# Patient Record
Sex: FEMALE | Race: ASIAN | NOT HISPANIC OR LATINO
[De-identification: names, ages, dates, MRNs, and addresses within clinical notes are randomized per-mention and may not be internally consistent; named-entity substitution may affect disease eponyms.]

---

## 2017-08-30 PROBLEM — Z00.00 ENCOUNTER FOR PREVENTIVE HEALTH EXAMINATION: Status: ACTIVE | Noted: 2017-08-30

## 2017-08-31 ENCOUNTER — APPOINTMENT (OUTPATIENT)
Dept: VASCULAR SURGERY | Facility: CLINIC | Age: 62
End: 2017-08-31
Payer: MEDICAID

## 2017-08-31 VITALS — HEART RATE: 58 BPM | OXYGEN SATURATION: 98 % | SYSTOLIC BLOOD PRESSURE: 154 MMHG | DIASTOLIC BLOOD PRESSURE: 84 MMHG

## 2017-08-31 DIAGNOSIS — Z82.49 FAMILY HISTORY OF ISCHEMIC HEART DISEASE AND OTHER DISEASES OF THE CIRCULATORY SYSTEM: ICD-10-CM

## 2017-08-31 PROCEDURE — 99204 OFFICE O/P NEW MOD 45 MIN: CPT | Mod: 25

## 2017-08-31 PROCEDURE — 93970 EXTREMITY STUDY: CPT

## 2017-09-06 PROBLEM — Z82.49 FAMILY HISTORY OF VARICOSE VEINS: Status: ACTIVE | Noted: 2017-09-06

## 2017-09-06 RX ORDER — VALSARTAN 40 MG/1
TABLET, COATED ORAL
Refills: 0 | Status: ACTIVE | COMMUNITY

## 2017-09-06 RX ORDER — AMLODIPINE BESYLATE 2.5 MG/1
2.5 TABLET ORAL
Refills: 0 | Status: ACTIVE | COMMUNITY

## 2017-09-06 RX ORDER — LEVOTHYROXINE SODIUM 0.1 MG/1
100 TABLET ORAL
Refills: 0 | Status: ACTIVE | COMMUNITY

## 2017-10-11 ENCOUNTER — APPOINTMENT (OUTPATIENT)
Dept: VASCULAR SURGERY | Facility: HOSPITAL | Age: 62
End: 2017-10-11

## 2017-10-11 ENCOUNTER — OUTPATIENT (OUTPATIENT)
Dept: OUTPATIENT SERVICES | Facility: HOSPITAL | Age: 62
LOS: 1 days | Discharge: HOME | End: 2017-10-11
Payer: MEDICAID

## 2017-10-11 PROCEDURE — 37766 PHLEB VEINS - EXTREM 20+: CPT | Mod: RT,GC

## 2017-10-17 DIAGNOSIS — M85.89 OTHER SPECIFIED DISORDERS OF BONE DENSITY AND STRUCTURE, MULTIPLE SITES: ICD-10-CM

## 2017-10-17 DIAGNOSIS — E66.9 OBESITY, UNSPECIFIED: ICD-10-CM

## 2017-10-17 DIAGNOSIS — I10 ESSENTIAL (PRIMARY) HYPERTENSION: ICD-10-CM

## 2017-10-17 DIAGNOSIS — I83.891 VARICOSE VEINS OF RIGHT LOWER EXTREMITY WITH OTHER COMPLICATIONS: ICD-10-CM

## 2017-10-19 ENCOUNTER — APPOINTMENT (OUTPATIENT)
Dept: VASCULAR SURGERY | Facility: CLINIC | Age: 62
End: 2017-10-19
Payer: MEDICAID

## 2017-10-19 VITALS — SYSTOLIC BLOOD PRESSURE: 132 MMHG | HEART RATE: 95 BPM | DIASTOLIC BLOOD PRESSURE: 82 MMHG | OXYGEN SATURATION: 96 %

## 2017-10-19 PROCEDURE — 99024 POSTOP FOLLOW-UP VISIT: CPT

## 2018-02-15 ENCOUNTER — APPOINTMENT (OUTPATIENT)
Dept: VASCULAR SURGERY | Facility: CLINIC | Age: 63
End: 2018-02-15
Payer: MEDICAID

## 2018-02-15 VITALS — HEART RATE: 58 BPM | OXYGEN SATURATION: 100 % | SYSTOLIC BLOOD PRESSURE: 138 MMHG | DIASTOLIC BLOOD PRESSURE: 76 MMHG

## 2018-02-15 DIAGNOSIS — S80.11XA CONTUSION OF RIGHT LOWER LEG, INITIAL ENCOUNTER: ICD-10-CM

## 2018-02-15 PROCEDURE — 99214 OFFICE O/P EST MOD 30 MIN: CPT

## 2018-05-09 ENCOUNTER — RESULT CHARGE (OUTPATIENT)
Age: 63
End: 2018-05-09

## 2018-05-10 ENCOUNTER — APPOINTMENT (OUTPATIENT)
Dept: VASCULAR SURGERY | Facility: CLINIC | Age: 63
End: 2018-05-10
Payer: MEDICAID

## 2018-05-10 PROCEDURE — 29580 STRAPPING UNNA BOOT: CPT | Mod: RT

## 2018-05-10 PROCEDURE — 99213 OFFICE O/P EST LOW 20 MIN: CPT | Mod: 25

## 2018-05-24 ENCOUNTER — APPOINTMENT (OUTPATIENT)
Dept: VASCULAR SURGERY | Facility: CLINIC | Age: 63
End: 2018-05-24
Payer: MEDICAID

## 2018-05-24 PROCEDURE — 99213 OFFICE O/P EST LOW 20 MIN: CPT

## 2018-06-21 ENCOUNTER — APPOINTMENT (OUTPATIENT)
Age: 63
End: 2018-06-21
Payer: MEDICAID

## 2018-06-21 VITALS — SYSTOLIC BLOOD PRESSURE: 140 MMHG | DIASTOLIC BLOOD PRESSURE: 80 MMHG | HEART RATE: 56 BPM | OXYGEN SATURATION: 96 %

## 2018-06-21 DIAGNOSIS — L97.309 NON-PRESSURE CHRONIC ULCER OF UNSPECIFIED ANKLE WITH UNSPECIFIED SEVERITY: ICD-10-CM

## 2018-06-21 DIAGNOSIS — I87.2 VENOUS INSUFFICIENCY (CHRONIC) (PERIPHERAL): ICD-10-CM

## 2018-06-21 PROCEDURE — 99213 OFFICE O/P EST LOW 20 MIN: CPT

## 2018-06-21 PROCEDURE — 93970 EXTREMITY STUDY: CPT

## 2018-06-21 RX ORDER — FLUOCINONIDE 0.5 MG/G
0.05 CREAM TOPICAL TWICE DAILY
Qty: 1 | Refills: 1 | Status: COMPLETED | COMMUNITY
Start: 2018-05-24 | End: 2018-06-21

## 2018-06-28 ENCOUNTER — APPOINTMENT (OUTPATIENT)
Dept: VASCULAR SURGERY | Facility: CLINIC | Age: 63
End: 2018-06-28
Payer: MEDICAID

## 2018-06-28 DIAGNOSIS — I87.2 VENOUS INSUFFICIENCY (CHRONIC) (PERIPHERAL): ICD-10-CM

## 2018-06-28 DIAGNOSIS — I83.899 VARICOSE VEINS OF UNSPECIFIED LOWER EXTREMITY WITH OTHER COMPLICATIONS: ICD-10-CM

## 2018-06-28 PROCEDURE — 36468 NJX SCLRSNT SPIDER VEINS: CPT

## 2019-12-13 ENCOUNTER — APPOINTMENT (OUTPATIENT)
Dept: VASCULAR SURGERY | Facility: CLINIC | Age: 64
End: 2019-12-13

## 2020-01-31 ENCOUNTER — APPOINTMENT (OUTPATIENT)
Dept: VASCULAR SURGERY | Facility: CLINIC | Age: 65
End: 2020-01-31
Payer: MEDICAID

## 2020-01-31 DIAGNOSIS — I70.1 ATHEROSCLEROSIS OF RENAL ARTERY: ICD-10-CM

## 2020-01-31 PROCEDURE — 99215 OFFICE O/P EST HI 40 MIN: CPT

## 2020-01-31 NOTE — END OF VISIT
[FreeTextEntry3] : All medical record entries made by the Scribe were at my, Dr. Thornton's, discretion and personally dictated by me on 01/31/2020 . I have reviewed the chart and agree that the record accurately reflects my personal performance of the history, physical exam, assessment and plan. I have also personally directed, reviewed and agreed to the chart.

## 2020-01-31 NOTE — HISTORY OF PRESENT ILLNESS
[FreeTextEntry1] : 65 y/o F with PMHx of HTN s/p RLE GSV RFA and RLE stab phlebectomy of large anterior thigh varicosity for pain of the R pretibia presents today for a follow up on RLE pain. Pt has been using compression stocking and Lidex to the R pretibia, improved, but pt still reports burning pain deep within the R pretibia, and occasional burning in the L pretibia despite conservative therapy.  She denies any new wounds or drainage from the skin, and denies fever/chills.

## 2020-01-31 NOTE — ASSESSMENT
[FreeTextEntry1] : 65 y/o F with HTN s/p RLE GSV RFA and RLE stab phlebectomy who presents here today for follow up of pain in the R pretibia and review of abdominal MRI.  I advised patient to c/w Lidex for her LE edema. I obtained patient's MRI abdomen which did not demonstrate a vascular problem. I informed patient that I will speak to the doctor who referred her here and then get back in contact with her.\par \par

## 2020-01-31 NOTE — REVIEW OF SYSTEMS
[As Noted in HPI] : as noted in HPI [Skin Lesions] : skin lesion [Itching] : itching [Negative] : Heme/Lymph [FreeTextEntry9] : + Muscle cramps

## 2020-01-31 NOTE — ADDENDUM
[FreeTextEntry1] : This note was written by Nola Fnuez on 01/31/2020  acting as scribe for Dr. Perez.

## 2020-01-31 NOTE — PHYSICAL EXAM
[Normal Thyroid] : the thyroid was normal [Normal Breath Sounds] : Normal breath sounds [Normal Heart Sounds] : normal heart sounds [0] : right 0 [1+] : left 1+ [2+] : right 2+ [Ankle Swelling (On Exam)] : present [Ankle Swelling Bilaterally] : bilaterally  [] : bilaterally [Ankle Swelling On The Right] : mild [Alert] : alert [Calm] : calm [JVD] : no jugular venous distention  [Purpura] : no purpura  [Petechiae] : no petechiae [Skin Ulcer] : no ulcer [Skin Induration] : no induration [de-identified] : Well-nourished, NAD [de-identified] : NC/AT [de-identified] : Large pannus [FreeTextEntry1] : +pedal edema [de-identified] : FROM in LE b/l, strength 5/5x4, neg Matt's sign, no palpable cords [de-identified] : Non-tender hyperemia of the R pretibia, no erythema noted

## 2020-01-31 NOTE — ASSESSMENT
[FreeTextEntry1] : 63 y/o F with HTN s/p RLE GSV RFA and RLE stab phlebectomy who presents here today for follow up of pain in the R pretibia and review of abdominal MRI.  I advised patient to c/w Lidex for her LE edema. I obtained patient's MRI abdomen which did not demonstrate a vascular problem. I informed patient that I will speak to the doctor who referred her here and then get back in contact with her.\par \par

## 2020-01-31 NOTE — ADDENDUM
[FreeTextEntry1] : This note was written by Nola Funez on 01/31/2020  acting as scribe for Dr. Perez.

## 2020-01-31 NOTE — PHYSICAL EXAM
[Normal Thyroid] : the thyroid was normal [Normal Breath Sounds] : Normal breath sounds [0] : right 0 [Normal Heart Sounds] : normal heart sounds [1+] : left 1+ [2+] : right 2+ [Ankle Swelling (On Exam)] : present [Ankle Swelling Bilaterally] : bilaterally  [] : present [Ankle Swelling On The Right] : mild [Alert] : alert [Calm] : calm [JVD] : no jugular venous distention  [Purpura] : no purpura  [Skin Ulcer] : no ulcer [Petechiae] : no petechiae [Skin Induration] : no induration [de-identified] : Well-nourished, NAD [FreeTextEntry1] : +pedal edema [de-identified] : Large pannus [de-identified] : NC/AT [de-identified] : FROM in LE b/l, strength 5/5x4, neg Matt's sign, no palpable cords [de-identified] : Non-tender hyperemia of the R pretibia, no erythema noted

## 2020-01-31 NOTE — PROCEDURE
[FreeTextEntry1] : R medial calf incompetent  injected at bedside w/sono guidance.  Skin cleaned with alcohol solution.  Vein injected w/ 0.5 cc of 1% sotradecol diluted with NS to 0.5% foam.  Successful injection of sclerosant into vein seen under u/s guidance.  Leg wrapped w/ ACE wrap.

## 2020-02-05 DIAGNOSIS — Z86.79 PERSONAL HISTORY OF OTHER DISEASES OF THE CIRCULATORY SYSTEM: ICD-10-CM

## 2020-02-05 PROBLEM — I70.1 RENAL ARTERY STENOSIS: Status: ACTIVE | Noted: 2020-02-05

## 2020-08-28 ENCOUNTER — APPOINTMENT (OUTPATIENT)
Dept: VASCULAR SURGERY | Facility: CLINIC | Age: 65
End: 2020-08-28
Payer: MEDICAID

## 2020-08-28 PROCEDURE — 93880 EXTRACRANIAL BILAT STUDY: CPT

## 2020-08-28 PROCEDURE — 99213 OFFICE O/P EST LOW 20 MIN: CPT

## 2020-08-28 NOTE — REVIEW OF SYSTEMS
[As Noted in HPI] : as noted in HPI [Skin Lesions] : skin lesion [Itching] : itching [Negative] : Psychiatric [FreeTextEntry9] : + Muscle cramps

## 2020-08-28 NOTE — PHYSICAL EXAM
[Normal Thyroid] : the thyroid was normal [Normal Breath Sounds] : Normal breath sounds [Respiratory Effort] : normal respiratory effort [0] : right 0 [Normal Rate and Rhythm] : normal rate and rhythm [Normal Heart Sounds] : normal heart sounds [1+] : left 1+ [Ankle Swelling Bilaterally] : bilaterally  [Ankle Swelling (On Exam)] : present [2+] : left 2+ [] : bilaterally [Ankle Swelling On The Right] : mild [Alert] : alert [Calm] : calm [JVD] : no jugular venous distention  [Purpura] : no purpura  [Petechiae] : no petechiae [Skin Ulcer] : no ulcer [Skin Induration] : no induration [de-identified] : Well-nourished, NAD [de-identified] : NC/AT [de-identified] : Supple  [FreeTextEntry1] : +pedal edema [de-identified] : FROM in LE b/l, strength 5/5x4, neg Matt's sign, no palpable cords [de-identified] : Non-tender hyperemia of the R pretibia, no erythema noted [de-identified] : Large pannus

## 2020-08-28 NOTE — ADDENDUM
[FreeTextEntry1] : This note was written by Katherin Rowe on 08/28/2020 acting as scribe for Obinna Patel M.D.\par \par I, Obinna Patel , have read and attest that all the information, medical decision making and discharge instructions within are true and accurate.

## 2020-08-28 NOTE — HISTORY OF PRESENT ILLNESS
[FreeTextEntry1] : 63 y/o F with PMHx of HTN s/p RLE GSV RFA and RLE stab phlebectomy of large anterior thigh varicosity for pain of the R pretibia presents today for a follow up on RLE pain. Pt has been using compression stocking and Lidex to the R pretibia, improved, but pt still reports burning pain deep within the R pretibia, and occasional burning in the L pretibia despite conservative therapy.  She denies any new wounds or drainage from the skin, and denies fever/chills.

## 2020-10-30 ENCOUNTER — APPOINTMENT (OUTPATIENT)
Dept: VASCULAR SURGERY | Facility: CLINIC | Age: 65
End: 2020-10-30
Payer: MEDICAID

## 2020-10-30 PROCEDURE — 99072 ADDL SUPL MATRL&STAF TM PHE: CPT

## 2020-10-30 PROCEDURE — 99213 OFFICE O/P EST LOW 20 MIN: CPT | Mod: 25

## 2020-10-30 PROCEDURE — 93971 EXTREMITY STUDY: CPT

## 2020-10-30 PROCEDURE — 29580 STRAPPING UNNA BOOT: CPT | Mod: LT

## 2020-10-31 NOTE — ASSESSMENT
[FreeTextEntry1] : 63 y/o F w/ hx of varicose veins s/p  s/p RLE GSV RFA and RLE stab phlebectomy. On exam, L Lateral tibia: dime size clean wound with surrounding erythema, no active drainage, no signs of infection. Feet are pink, toes are warm to touch with adequate capillary refill. LLE venous doppler done at the office today: Negative DVT, GSV/SSV patent. LLE Wound care: Wounds cleaned with hydrogen peroxide, A&D ointment applied. Wrapped with unna boot, Kerlix and ACE bandage. Discussed with pt no vascular surgery intervention required at the moment.  Will set up VNS for weekly unna boot placement and wound care. Patient to f/u with us here in 4 weeks. She was advised to call the office in case of developing any concerning symptoms. \par  [Ulcer Care] : ulcer care

## 2020-10-31 NOTE — HISTORY OF PRESENT ILLNESS
[FreeTextEntry1] : 65 y/o F with PMHx of HTN  large varicose vein to the anterior R thigh and pretibia s/p RLE GSV RFA presents today for a follow up on RLE pain. Patient presents for 2 month follow up, pt reports she developed  new ulcer to the  LLE x 2 weeks ago, initially it was a blister which ruptured with clear drainage and has been very painful, she also c/o burning sensation around the wound area. \par Denies: fever, chills.

## 2020-10-31 NOTE — PROCEDURE
[FreeTextEntry1] : LLE venous doppler done at the office today: Negative DVT, GSV/SSV patent. \par \par LLE Wound care: Wounds cleaned with hydrogen peroxide, A&D ointment applied. Wrapped with unna boot, Kerlix and ACE bandage.\par

## 2020-10-31 NOTE — ADDENDUM
[FreeTextEntry1] : This note was written by Katherin Rowe on 10/30/2020 acting as scribe for Obinna Barreto M.D.\par \par I, Obinna Patel have read and attest that all the information, medical decision making and discharge instructions within are true and accurate.

## 2020-10-31 NOTE — PHYSICAL EXAM
[Normal Thyroid] : the thyroid was normal [Normal Breath Sounds] : Normal breath sounds [Respiratory Effort] : normal respiratory effort [Normal Heart Sounds] : normal heart sounds [Normal Rate and Rhythm] : normal rate and rhythm [0] : right 0 [1+] : left 1+ [2+] : left 2+ [Ankle Swelling (On Exam)] : present [Ankle Swelling Bilaterally] : bilaterally  [] : bilaterally [Ankle Swelling On The Right] : mild [Alert] : alert [Calm] : calm [Oriented to Person] : oriented to person [Oriented to Place] : oriented to place [Oriented to Time] : oriented to time [JVD] : no jugular venous distention  [Purpura] : no purpura  [Petechiae] : no petechiae [Skin Ulcer] : no ulcer [Skin Induration] : no induration [de-identified] : Well-nourished, NAD [de-identified] : NC/AT [de-identified] : Supple  [de-identified] : Large pannus [de-identified] : FROM in LOGAN b/l, strength 5/5x4. [de-identified] : L Lateral tibia: dime size clean wound with surrounding erythema, no active drainage, no signs of infection. Feet are pink, toes are warm to touch with adequate capillary refill.

## 2020-11-13 ENCOUNTER — APPOINTMENT (OUTPATIENT)
Dept: VASCULAR SURGERY | Facility: CLINIC | Age: 65
End: 2020-11-13
Payer: MEDICARE

## 2020-11-13 PROCEDURE — 99072 ADDL SUPL MATRL&STAF TM PHE: CPT

## 2020-11-13 PROCEDURE — 29580 STRAPPING UNNA BOOT: CPT | Mod: LT

## 2020-11-13 PROCEDURE — 99212 OFFICE O/P EST SF 10 MIN: CPT | Mod: 25

## 2020-11-13 NOTE — ASSESSMENT
[FreeTextEntry1] : 66 y/o F w/ hx of varicose veins s/p  s/p RLE GSV RFA and RLE stab phlebectomy. On exam, L Lateral tibia: dime size clean wound slightly smaller with surrounding erythema, no active drainage, no signs of infection. Feet are pink, toes are warm to touch with adequate capillary refill. LLE Wound care: Wounds cleaned with hydrogen peroxide, A&D ointment applied. Wrapped with unna boot, Kerlix and ACE bandage. VNS services to begin wound care at pts home next week. Pt will f/u with us in 4 weeks. Patient to contact the office in case she develops any concerning symptoms. \par  \par

## 2020-11-13 NOTE — PHYSICAL EXAM
[Normal Thyroid] : the thyroid was normal [Normal Breath Sounds] : Normal breath sounds [Respiratory Effort] : normal respiratory effort [Normal Heart Sounds] : normal heart sounds [Normal Rate and Rhythm] : normal rate and rhythm [0] : right 0 [1+] : left 1+ [2+] : left 2+ [Ankle Swelling (On Exam)] : present [Ankle Swelling Bilaterally] : bilaterally  [] : bilaterally [Ankle Swelling On The Right] : mild [Alert] : alert [Oriented to Person] : oriented to person [Oriented to Place] : oriented to place [Oriented to Time] : oriented to time [Calm] : calm [JVD] : no jugular venous distention  [Purpura] : no purpura  [Petechiae] : no petechiae [Skin Ulcer] : no ulcer [Skin Induration] : no induration [de-identified] : Well-nourished, NAD [de-identified] : NC/AT [de-identified] : Supple  [de-identified] : Large pannus [de-identified] : FROM in LE b/l, strength 5/5 x 4. [de-identified] : L Lateral tibia: dime size clean wound slightly smaller with surrounding erythema, no active drainage, no signs of infection. Feet are pink, toes are warm to touch with adequate capillary refill.

## 2020-11-13 NOTE — PROCEDURE
[FreeTextEntry1] : LLE Wound care: Wounds cleaned with hydrogen peroxide, A&D ointment applied. Wrapped with unna boot, Kerlix and ACE bandage.\par

## 2020-11-13 NOTE — HISTORY OF PRESENT ILLNESS
[FreeTextEntry1] : 64 y/o F with PMHx of HTN, large varicose vein to the anterior R thigh and pretibial s/p RLE GSV RFA presents today for 4 week follow up for a LLE ulcer . Pt reports here feeling well, she does continue to c/o mild pain to the LLE but notice it has somewhat improved.She reports VNS approved to begin next week.  Denies: fever, chills.

## 2020-11-13 NOTE — ADDENDUM
[FreeTextEntry1] : This note was written by Katherin Rowe on 11/13/2020 acting as scribe for Obinna Barreto M.D.\par \par I, Obinna Patel have read and attest that all the information, medical decision making and discharge instructions within are true and accurate.

## 2020-12-02 ENCOUNTER — APPOINTMENT (OUTPATIENT)
Dept: VASCULAR SURGERY | Facility: CLINIC | Age: 65
End: 2020-12-02
Payer: MEDICARE

## 2020-12-02 DIAGNOSIS — L97.921 NON-PRESSURE CHRONIC ULCER OF UNSPECIFIED PART OF LEFT LOWER LEG LIMITED TO BREAKDOWN OF SKIN: ICD-10-CM

## 2020-12-02 PROCEDURE — 29580 STRAPPING UNNA BOOT: CPT | Mod: LT

## 2020-12-02 PROCEDURE — 99214 OFFICE O/P EST MOD 30 MIN: CPT | Mod: 25

## 2020-12-02 PROCEDURE — 99072 ADDL SUPL MATRL&STAF TM PHE: CPT

## 2020-12-02 NOTE — ASSESSMENT
[FreeTextEntry1] : 64 y/o F w/ hx of varicose veins s/p  s/p RLE GSV RFA and RLE stab phlebectomy is here for LLE venous stasis ulcer f/u.On exam, L Lateral tibia: ulcer is healing well, much smaller, no active drainage, no signs of infection. Feet are pink, toes are warm to touch with adequate capillary refill. LLE Wound care:  Wrapped with unna boot, Kerlix and ACE bandage. Unna boot dressing for 2 weeks was provided to the patient Pt will f/u with us in 4 weeks. Patient to contact the office in case she develops any concerning symptoms. \par  \par  [Ulcer Care] : ulcer care

## 2020-12-02 NOTE — HISTORY OF PRESENT ILLNESS
[FreeTextEntry1] : 66 y/o F presents today for follow up for a LLE ulcer . Pt reports here feeling well, she does continue to c/o mild pain at the ulcer site, but noticed it has been getting smaller..She reports that VNS came to evaluate her, but due to lack of supplies they closed the case. Patient had few unna boot at home and her  wrapped her leg since it is difficult for her to come to the city for her appointments.  Denies: fever, chills.

## 2022-01-03 ENCOUNTER — APPOINTMENT (OUTPATIENT)
Dept: VASCULAR SURGERY | Facility: CLINIC | Age: 67
End: 2022-01-03

## 2022-08-11 ENCOUNTER — APPOINTMENT (OUTPATIENT)
Dept: VASCULAR SURGERY | Facility: CLINIC | Age: 67
End: 2022-08-11

## 2022-08-11 PROCEDURE — 29580 STRAPPING UNNA BOOT: CPT

## 2022-08-11 PROCEDURE — 99213 OFFICE O/P EST LOW 20 MIN: CPT | Mod: 25

## 2022-08-11 NOTE — HISTORY OF PRESENT ILLNESS
[FreeTextEntry1] : 65 y/o F presents today with a new RLE ulcers . Pt previously had venous stasis ulcer on her LLE that were successfully treated with unna boot wraps. She was last seen here in December, 2020. Patient states that her ulcers developed a week ago, she tried to wrap her leg at home, however she doesn't see any improvement.  Denies edema, pain, fever, chills.

## 2022-08-11 NOTE — PHYSICAL EXAM
[Normal Thyroid] : the thyroid was normal [Normal Breath Sounds] : Normal breath sounds [Respiratory Effort] : normal respiratory effort [Normal Heart Sounds] : normal heart sounds [Normal Rate and Rhythm] : normal rate and rhythm [] : bilaterally [Ankle Swelling On The Right] : mild [Alert] : alert [Oriented to Person] : oriented to person [Oriented to Place] : oriented to place good, to achieve stated therapy goals [Oriented to Time] : oriented to time [Calm] : calm [JVD] : no jugular venous distention  [2+] : right 2+ [Ankle Swelling (On Exam)] : not present [Purpura] : no purpura  [Petechiae] : no petechiae [Skin Ulcer] : no ulcer [Skin Induration] : no induration [de-identified] : Well-nourished, NAD [de-identified] : NC/AT [de-identified] : Supple  [de-identified] : Large pannus [de-identified] : FROM in LE b/l, strength 5/5 x 4. [de-identified] : R Lateral tibia: 0.5 x 0.5 cm ulcer clean, no active drainage, 1 x 1 cm ulcer over posterior calf, superficial,  no signs of infection.

## 2022-08-11 NOTE — ASSESSMENT
[Ulcer Care] : ulcer care [FreeTextEntry1] : 65 y/o F presents today with a new RLE ulcers . Pt previously had venous stasis ulcer on her LLE that were successfully treated with unna boot wraps. She was last seen here in December, 2020. Patient states that her ulcers developed a week ago, she tried to wrap her leg at home, however she doesn't see any improvement.  Denies edema, pain, fever, chills. \par Recurrent venous stasis ulcers of right lower leg, superficial, without signs of infection.\par RLE Wound care:  Wrapped with unna boot, Kerlix and ACE bandage.\par Patient is traveling to [place] for a wedding and will ask her spouse to wrap her leg next week, supplies for 1 time were provided.\par She will f/u upon her return if ulcers not healed at that time for evaluation.\par

## 2022-08-11 NOTE — REVIEW OF SYSTEMS
[As Noted in HPI] : as noted in HPI [Skin Lesions] : skin lesion [Negative] : Heme/Lymph [Leg Claudication] : no intermittent leg claudication [Lower Ext Edema] : no lower extremity edema [Itching] : no itching [FreeTextEntry9] : + Muscle cramps

## 2022-08-18 ENCOUNTER — APPOINTMENT (OUTPATIENT)
Dept: VASCULAR SURGERY | Facility: CLINIC | Age: 67
End: 2022-08-18

## 2022-08-18 PROCEDURE — 29580 STRAPPING UNNA BOOT: CPT

## 2022-08-19 NOTE — PHYSICAL EXAM
[JVD] : no jugular venous distention  [Normal Thyroid] : the thyroid was normal [Normal Breath Sounds] : Normal breath sounds [Respiratory Effort] : normal respiratory effort [Normal Heart Sounds] : normal heart sounds [Normal Rate and Rhythm] : normal rate and rhythm [2+] : right 2+ [Ankle Swelling (On Exam)] : not present [] : bilaterally [Ankle Swelling On The Right] : mild [Purpura] : no purpura  [Petechiae] : no petechiae [Skin Ulcer] : no ulcer [Skin Induration] : no induration [Alert] : alert [Oriented to Person] : oriented to person [Oriented to Place] : oriented to place [Oriented to Time] : oriented to time [Calm] : calm [de-identified] : Well-nourished, NAD [de-identified] : NC/AT [de-identified] : Supple  [de-identified] : Large pannus [de-identified] : FROM in LE b/l, strength 5/5 x 4. [de-identified] : R Lateral tibia: 0.5 x 0.5 cm ulcer clean, no active drainage, 1 x 1 cm ulcer over posterior calf, superficial,  no signs of infection.

## 2022-08-19 NOTE — REVIEW OF SYSTEMS
[Leg Claudication] : no intermittent leg claudication [Lower Ext Edema] : no lower extremity edema [As Noted in HPI] : as noted in HPI [Skin Lesions] : skin lesion [Itching] : no itching [Negative] : Heme/Lymph [FreeTextEntry9] : + Muscle cramps

## 2022-08-19 NOTE — ASSESSMENT
[FreeTextEntry1] : 65 y/o F presents today with a new RLE ulcers . Pt previously had venous stasis ulcer on her LLE that were successfully treated with unna boot wraps. She was last seen here in December, 2020. Patient states that her ulcers are healing well, decreasing in size. Denies edema, pain, fever, chills. \par RLL ulcers are healing well, decreasing in size.\par R lower leg was wrapped with unna boot, Kerlix and ACE bandage.\par Patient will come back in 2 weeks, supplies for next week unna boot change was provided.\par   [Ulcer Care] : ulcer care

## 2022-08-19 NOTE — HISTORY OF PRESENT ILLNESS
[FreeTextEntry1] : 65 y/o F presents today with a new RLE ulcers . Pt previously had venous stasis ulcer on her LLE that were successfully treated with unna boot wraps. She was last seen here in December, 2020. Patient states that her ulcers are healing well, decreasing in size. Denies edema, pain, fever, chills.

## 2022-12-28 ENCOUNTER — APPOINTMENT (OUTPATIENT)
Dept: VASCULAR SURGERY | Facility: CLINIC | Age: 67
End: 2022-12-28

## 2022-12-28 PROCEDURE — 29580 STRAPPING UNNA BOOT: CPT | Mod: RT

## 2022-12-28 PROCEDURE — 99212 OFFICE O/P EST SF 10 MIN: CPT | Mod: 25

## 2022-12-29 NOTE — ASSESSMENT
[Ulcer Care] : ulcer care [FreeTextEntry1] : 65 y/o F presents today with a new RLE ulcer that she noticed few weeks ago. Denies fever, chills. \par R medial calf with 1x1 cm ulcer, no signs of infection. \par RLE Wound care: wound was cleansed with Hydrogen peroxide, Silvadene applied,  leg was wrapped with unna boot, Kerlix and ACE bandage.\par Patient will come back in 1 week for reevaluation and unna boot change.\par

## 2022-12-29 NOTE — PHYSICAL EXAM
[Normal Thyroid] : the thyroid was normal [Normal Breath Sounds] : Normal breath sounds [Respiratory Effort] : normal respiratory effort [Normal Heart Sounds] : normal heart sounds [Normal Rate and Rhythm] : normal rate and rhythm [2+] : right 2+ [] : bilaterally [Ankle Swelling On The Right] : mild [Alert] : alert [Oriented to Person] : oriented to person [Oriented to Place] : oriented to place [Oriented to Time] : oriented to time [Calm] : calm [JVD] : no jugular venous distention  [Ankle Swelling (On Exam)] : not present [Purpura] : no purpura  [Petechiae] : no petechiae [Skin Ulcer] : no ulcer [Skin Induration] : no induration [de-identified] : Well-nourished, NAD [de-identified] : NC/AT [de-identified] : Supple  [de-identified] : Large pannus [de-identified] : FROM in LE b/l, strength 5/5 x 4. [de-identified] : R medial calf with 1x1 cm ulcer, no signs of infection.

## 2022-12-29 NOTE — HISTORY OF PRESENT ILLNESS
[FreeTextEntry1] : 65 y/o F presents today with a new RLE ulcer that she noticed few weeks ago . Pt states that she wrapped her leg with unna boot, however she doesn't see any improvement, ulcer is very painful and keeps her up at night. Denies edema, pain, fever, chills.

## 2022-12-29 NOTE — REVIEW OF SYSTEMS
[As Noted in HPI] : as noted in HPI [Skin Lesions] : skin lesion [Negative] : Heme/Lymph [Fever] : no fever [Chills] : no chills [Leg Claudication] : no intermittent leg claudication [Lower Ext Edema] : no lower extremity edema [Itching] : no itching [FreeTextEntry9] : + Muscle cramps

## 2022-12-29 NOTE — PROCEDURE
[FreeTextEntry1] : RLE Wound care: wound was cleansed with Hydrogen peroxide, Silvadene applied,  leg was wrapped with unna boot, Kerlix and ACE bandage.\par

## 2023-01-03 ENCOUNTER — APPOINTMENT (OUTPATIENT)
Dept: VASCULAR SURGERY | Facility: CLINIC | Age: 68
End: 2023-01-03
Payer: MEDICARE

## 2023-01-03 PROCEDURE — 29580 STRAPPING UNNA BOOT: CPT

## 2023-01-03 NOTE — HISTORY OF PRESENT ILLNESS
[FreeTextEntry1] : 65 y/o F presents today for a follow up on  a new RLE ulcer that she noticed few weeks ago . She was seen last week and was treated with Silvadene ointment and unna boot wrap. She states that the ulcer continues to be painful, denies fever, chills.

## 2023-01-03 NOTE — PROCEDURE
[FreeTextEntry1] : RLE Wound care: wound was cleansed with Hydrogen peroxide, Silvadene applied,  leg was wrapped with Calamine unna boot, Kerlix and ACE bandage.\par

## 2023-01-03 NOTE — PHYSICAL EXAM
[Normal Thyroid] : the thyroid was normal [Normal Breath Sounds] : Normal breath sounds [Respiratory Effort] : normal respiratory effort [Normal Heart Sounds] : normal heart sounds [Normal Rate and Rhythm] : normal rate and rhythm [2+] : right 2+ [] : bilaterally [Ankle Swelling On The Right] : mild [Alert] : alert [Oriented to Person] : oriented to person [Oriented to Place] : oriented to place [Oriented to Time] : oriented to time [Calm] : calm [JVD] : no jugular venous distention  [Ankle Swelling (On Exam)] : not present [Purpura] : no purpura  [Petechiae] : no petechiae [Skin Ulcer] : no ulcer [Skin Induration] : no induration [de-identified] : Well-nourished, NAD [de-identified] : NC/AT [de-identified] : Supple  [de-identified] : Large pannus [de-identified] : FROM in LE b/l, strength 5/5 x 4. [de-identified] : R medial calf with 1x1 cm ulcer, no signs of infection.

## 2023-01-03 NOTE — ASSESSMENT
[Ulcer Care] : ulcer care [FreeTextEntry1] : 65 y/o F presents today for a follow up on  a new RLE ulcer that she noticed few weeks ago .\par R medial calf with 1x1 cm ulcer, no signs of infection. \par RLE Wound care: wound was cleansed with Hydrogen peroxide, Silvadene applied,  leg was wrapped with Calamine  unna boot, Kerlix and ACE bandage.\par Patient will come back in 1 week for reevaluation and unna boot change.\par

## 2023-01-09 ENCOUNTER — APPOINTMENT (OUTPATIENT)
Dept: VASCULAR SURGERY | Facility: CLINIC | Age: 68
End: 2023-01-09
Payer: MEDICARE

## 2023-01-09 PROCEDURE — 29580 STRAPPING UNNA BOOT: CPT

## 2023-01-09 NOTE — REVIEW OF SYSTEMS
[Fever] : no fever [Chills] : no chills [Leg Claudication] : no intermittent leg claudication [Lower Ext Edema] : no lower extremity edema [As Noted in HPI] : as noted in HPI [Skin Lesions] : skin lesion [Itching] : no itching [Negative] : Heme/Lymph [FreeTextEntry9] : + Muscle cramps

## 2023-01-09 NOTE — PROCEDURE
[FreeTextEntry1] : RLE Wound care: Hydrocortisone cream was applied to the skin around the wound, leg was wrapped with Calamine unna boot, Kerlix and ACE bandage.\par

## 2023-01-09 NOTE — ASSESSMENT
[FreeTextEntry1] : 68 y/o F presents today for a follow up on  a new RLE ulcer that she noticed few weeks ago.\par R medial calf with 1x1 cm ulcer, no signs of infection. \par RLE Wound care: Hydrocortisone cream was applied to the skin around the wound, leg was wrapped with Calamine unna boot, Kerlix and ACE bandage.\par Patient will come back in 1 week for reevaluation and unna boot change.\par   [Ulcer Care] : ulcer care

## 2023-01-09 NOTE — PHYSICAL EXAM
[JVD] : no jugular venous distention  [Normal Thyroid] : the thyroid was normal [Normal Breath Sounds] : Normal breath sounds [Respiratory Effort] : normal respiratory effort [Normal Heart Sounds] : normal heart sounds [Normal Rate and Rhythm] : normal rate and rhythm [2+] : right 2+ [Ankle Swelling (On Exam)] : not present [] : bilaterally [Ankle Swelling On The Right] : mild [Purpura] : no purpura  [Petechiae] : no petechiae [Skin Ulcer] : no ulcer [Skin Induration] : no induration [Alert] : alert [Oriented to Person] : oriented to person [Oriented to Place] : oriented to place [Oriented to Time] : oriented to time [Calm] : calm [de-identified] : Well-nourished, NAD [de-identified] : NC/AT [de-identified] : Supple  [de-identified] : Large pannus [de-identified] : FROM in LE b/l, strength 5/5 x 4. [de-identified] : R medial calf with 0.8x0.8 cm ulcer, no signs of infection.

## 2023-01-09 NOTE — HISTORY OF PRESENT ILLNESS
[FreeTextEntry1] : 68 y/o F presents today for a follow up on  a new RLE ulcer that she noticed few weeks ago. She was seen last week and was treated with Silvadene ointment and unna boot wrap. She states that the ulcer is less painful, but c/o burning around the wound, denies fever, chills.

## 2023-01-17 ENCOUNTER — APPOINTMENT (OUTPATIENT)
Dept: VASCULAR SURGERY | Facility: CLINIC | Age: 68
End: 2023-01-17
Payer: MEDICARE

## 2023-01-17 PROCEDURE — 29580 STRAPPING UNNA BOOT: CPT

## 2023-01-17 NOTE — ASSESSMENT
[Ulcer Care] : ulcer care [FreeTextEntry1] : 66 y/o F presents today for a follow up on a new RLE ulcer that she noticed few weeks ago.\par R medial calf with 1x1 cm ulcer, no signs of infection. \par RLE was wrapped with Zinc unna boot, Kerlix and ACE bandage.\par Patient will come back in 1 week for reevaluation and unna boot change.\par

## 2023-01-17 NOTE — PHYSICAL EXAM
[Normal Thyroid] : the thyroid was normal [Normal Breath Sounds] : Normal breath sounds [Respiratory Effort] : normal respiratory effort [Normal Heart Sounds] : normal heart sounds [Normal Rate and Rhythm] : normal rate and rhythm [2+] : right 2+ [] : bilaterally [Ankle Swelling On The Right] : mild [Alert] : alert [Oriented to Person] : oriented to person [Oriented to Place] : oriented to place [Oriented to Time] : oriented to time [Calm] : calm [JVD] : no jugular venous distention  [Ankle Swelling (On Exam)] : not present [Purpura] : no purpura  [Petechiae] : no petechiae [Skin Ulcer] : no ulcer [Skin Induration] : no induration [de-identified] : Well-nourished, NAD [de-identified] : NC/AT [de-identified] : Supple  [de-identified] : Large pannus [de-identified] : FROM in LE b/l, strength 5/5 x 4. [de-identified] : R medial calf with 0.8x0.8 cm ulcer, no signs of infection.

## 2023-01-17 NOTE — HISTORY OF PRESENT ILLNESS
[FreeTextEntry1] : 66 y/o F presents today for a follow up on  a new RLE ulcer that she noticed few weeks ago. She was seen last week and was treated with Silvadene ointment and unna boot wrap. She states that the ulcer is less painful, denies fever, chills.

## 2023-01-23 ENCOUNTER — APPOINTMENT (OUTPATIENT)
Dept: VASCULAR SURGERY | Facility: CLINIC | Age: 68
End: 2023-01-23
Payer: MEDICARE

## 2023-01-23 PROCEDURE — 29580 STRAPPING UNNA BOOT: CPT

## 2023-01-23 NOTE — ASSESSMENT
[FreeTextEntry1] : 66 y/o F presents today for a follow up on a new RLE ulcer that she noticed few weeks ago.\par R medial calf with 1x1 cm ulcer, no signs of infection. \par RLE was wrapped with Zinc unna boot, Kerlix and ACE bandage.\par Patient will come back in 1 week for reevaluation and unna boot change.\par   [Ulcer Care] : ulcer care

## 2023-01-23 NOTE — PHYSICAL EXAM
[JVD] : no jugular venous distention  [Normal Thyroid] : the thyroid was normal [Normal Breath Sounds] : Normal breath sounds [Respiratory Effort] : normal respiratory effort [Normal Heart Sounds] : normal heart sounds [Normal Rate and Rhythm] : normal rate and rhythm [2+] : right 2+ [Ankle Swelling (On Exam)] : not present [] : bilaterally [Ankle Swelling On The Right] : mild [Purpura] : no purpura  [Petechiae] : no petechiae [Skin Ulcer] : no ulcer [Skin Induration] : no induration [Alert] : alert [Oriented to Person] : oriented to person [Oriented to Place] : oriented to place [Oriented to Time] : oriented to time [Calm] : calm [de-identified] : Well-nourished, NAD [de-identified] : NC/AT [de-identified] : Supple  [de-identified] : Large pannus [de-identified] : FROM in LE b/l, strength 5/5 x 4. [de-identified] : R medial calf with 0.8x0.8 cm ulcer, no signs of infection.

## 2023-01-23 NOTE — HISTORY OF PRESENT ILLNESS
[FreeTextEntry1] : 66 y/o F presents today for a follow up on  a new RLE ulcer that she noticed few weeks ago. She was seen last week and was treated with Silvadene ointment and unna boot wrap. She states that the ulcer is not painful any longer, just little burning,  denies fever, chills.

## 2023-01-30 ENCOUNTER — APPOINTMENT (OUTPATIENT)
Dept: VASCULAR SURGERY | Facility: CLINIC | Age: 68
End: 2023-01-30
Payer: MEDICARE

## 2023-01-30 PROCEDURE — 29580 STRAPPING UNNA BOOT: CPT

## 2023-01-30 NOTE — PHYSICAL EXAM
[Normal Thyroid] : the thyroid was normal [Normal Breath Sounds] : Normal breath sounds [Respiratory Effort] : normal respiratory effort [Normal Heart Sounds] : normal heart sounds [Normal Rate and Rhythm] : normal rate and rhythm [2+] : right 2+ [] : bilaterally [Ankle Swelling On The Right] : mild [Alert] : alert [Oriented to Person] : oriented to person [Oriented to Place] : oriented to place [Oriented to Time] : oriented to time [Calm] : calm [JVD] : no jugular venous distention  [Ankle Swelling (On Exam)] : not present [Purpura] : no purpura  [Petechiae] : no petechiae [Skin Ulcer] : no ulcer [Skin Induration] : no induration [de-identified] : Well-nourished, NAD [de-identified] : NC/AT [de-identified] : Supple  [de-identified] : Large pannus [de-identified] : FROM in LE b/l, strength 5/5 x 4. [de-identified] : R medial calf with almost healed ulcer, no signs of infection.  [de-identified] : grossly intact

## 2023-01-30 NOTE — ASSESSMENT
[Ulcer Care] : ulcer care [FreeTextEntry1] : 66 y/o F presents today for a follow up on a new RLE ulcer that she noticed few weeks ago.\par R medial calf with almost healed ulcer, no signs of infection. \par RLE was wrapped with Zinc unna boot, Kerlix and ACE bandage.\par Patient will come back in 1 week for reevaluation and unna boot change.\par

## 2023-02-06 ENCOUNTER — APPOINTMENT (OUTPATIENT)
Dept: VASCULAR SURGERY | Facility: CLINIC | Age: 68
End: 2023-02-06
Payer: MEDICARE

## 2023-02-07 ENCOUNTER — APPOINTMENT (OUTPATIENT)
Dept: VASCULAR SURGERY | Facility: CLINIC | Age: 68
End: 2023-02-07
Payer: MEDICARE

## 2023-02-07 PROCEDURE — 29580 STRAPPING UNNA BOOT: CPT

## 2023-02-07 RX ORDER — OXYBUTYNIN CHLORIDE 10 MG/1
10 TABLET, EXTENDED RELEASE ORAL
Qty: 90 | Refills: 0 | Status: ACTIVE | COMMUNITY
Start: 2022-12-03

## 2023-02-07 RX ORDER — LOSARTAN POTASSIUM AND HYDROCHLOROTHIAZIDE 12.5; 1 MG/1; MG/1
100-12.5 TABLET ORAL
Qty: 90 | Refills: 0 | Status: ACTIVE | COMMUNITY
Start: 2022-06-15

## 2023-02-07 RX ORDER — AMLODIPINE BESYLATE 5 MG/1
5 TABLET ORAL
Qty: 90 | Refills: 0 | Status: ACTIVE | COMMUNITY
Start: 2022-10-06

## 2023-02-07 RX ORDER — ALCLOMETASONE DIPROPIONATE 0.5 MG/G
0.05 CREAM TOPICAL
Qty: 15 | Refills: 0 | Status: ACTIVE | COMMUNITY
Start: 2022-08-17

## 2023-02-07 NOTE — REVIEW OF SYSTEMS
[Fever] : no fever [Chills] : no chills [Leg Claudication] : no intermittent leg claudication [Lower Ext Edema] : no lower extremity edema [Itching] : no itching [FreeTextEntry9] : + Muscle cramps

## 2023-02-07 NOTE — ASSESSMENT
[FreeTextEntry1] : 68 y/o F presents today for a follow up on  RLE venous stasis that is almost healed. She has been responding well  unna boot wrap. \par R medial calf with almost healed ulcer, no signs of infection. \par RLE was moisturized with vitamin A&D ointment, wrapped with Zinc unna boot, Kerlix and ACE bandage.\par Patient will come back in 1 week for reevaluation.\par

## 2023-02-07 NOTE — HISTORY OF PRESENT ILLNESS
[FreeTextEntry1] : 66 y/o F presents today for a follow up on  RLE venous stasis that is almost healed. She has been responding well  unna boot wrap. She states that the ulcer is not painful any longer, denies fever, chills.

## 2023-02-07 NOTE — PHYSICAL EXAM
[JVD] : no jugular venous distention  [Ankle Swelling (On Exam)] : not present [Purpura] : no purpura  [Petechiae] : no petechiae [Skin Ulcer] : no ulcer [Skin Induration] : no induration [de-identified] : Well-nourished, NAD [de-identified] : NC/AT [de-identified] : Supple  [de-identified] : Large pannus [de-identified] : FROM in LE b/l, strength 5/5 x 4. [de-identified] : R medial calf with almost healed ulcer, no signs of infection.  [de-identified] : grossly intact

## 2023-02-13 ENCOUNTER — APPOINTMENT (OUTPATIENT)
Dept: VASCULAR SURGERY | Facility: CLINIC | Age: 68
End: 2023-02-13
Payer: MEDICARE

## 2023-02-13 PROCEDURE — 29580 STRAPPING UNNA BOOT: CPT

## 2023-02-13 NOTE — HISTORY OF PRESENT ILLNESS
[FreeTextEntry1] : 66 y/o F presents today for a follow up on  RLE venous stasis that is almost healed. She has been responding well  unna boot wrap. She states that the ulcer is almost healed, denies pain, fever, chills.

## 2023-02-13 NOTE — PHYSICAL EXAM
[Normal Thyroid] : the thyroid was normal [Normal Breath Sounds] : Normal breath sounds [Respiratory Effort] : normal respiratory effort [Normal Heart Sounds] : normal heart sounds [Normal Rate and Rhythm] : normal rate and rhythm [2+] : right 2+ [] : bilaterally [Ankle Swelling On The Right] : mild [Alert] : alert [Oriented to Person] : oriented to person [Oriented to Place] : oriented to place [Oriented to Time] : oriented to time [Calm] : calm [JVD] : no jugular venous distention  [Purpura] : no purpura  [Ankle Swelling (On Exam)] : not present [Petechiae] : no petechiae [Skin Ulcer] : no ulcer [Skin Induration] : no induration [de-identified] : Well-nourished, NAD [de-identified] : NC/AT [de-identified] : Supple  [de-identified] : Large pannus [de-identified] : FROM in LE b/l, strength 5/5 x 4. [de-identified] : R medial calf with almost healed ulcer, no signs of infection.  [de-identified] : grossly intact

## 2023-06-12 ENCOUNTER — APPOINTMENT (OUTPATIENT)
Dept: VASCULAR SURGERY | Facility: CLINIC | Age: 68
End: 2023-06-12
Payer: MEDICARE

## 2023-06-12 PROCEDURE — 29580 STRAPPING UNNA BOOT: CPT

## 2023-06-12 PROCEDURE — 99212 OFFICE O/P EST SF 10 MIN: CPT | Mod: 25

## 2023-06-13 NOTE — PROCEDURE
[FreeTextEntry1] : RLE: Calcium Alginate was applied to the ulcers, leg was wrapped with calamine unna boot, secured with kerlix/ace.

## 2023-06-13 NOTE — ASSESSMENT
[Foot care/Footwear] : foot care/footwear [Ulcer Care] : ulcer care [FreeTextEntry1] : 67yoF with hx of RLE venous stasis ulcers treated with unna boot, finally healed and she was last seen in February returns today for an evaluation.  She developed new ulcers over her right medial malleolus and has a clear drainage\par R medial calf with moderate leg edema, new ulcerations over medial malleolus, clear fluid draining from the ulcers, no signs of infection. \par Calcium Alginate was applied to the ulcers, leg was wrapped with calamine unna boot, secured with kerlix/ace.\par We discussed the findings and also recommend for her to be seen at a wound care center since she has frequent recurrence despite wearing compression stockings on a daily basis.\par Patient will come back in 1 week for reevaluation.\par   [Arterial/Venous Disease] : arterial/venous disease

## 2023-06-13 NOTE — PHYSICAL EXAM
[Normal Thyroid] : the thyroid was normal [Normal Breath Sounds] : Normal breath sounds [Respiratory Effort] : normal respiratory effort [Normal Heart Sounds] : normal heart sounds [Normal Rate and Rhythm] : normal rate and rhythm [2+] : right 2+ [] : bilaterally [Alert] : alert [Oriented to Person] : oriented to person [Oriented to Place] : oriented to place [Oriented to Time] : oriented to time [Calm] : calm [Ankle Swelling (On Exam)] : present [Ankle Swelling On The Right] : of the right ankle [Ankle Swelling On The Left] : moderate [JVD] : no jugular venous distention  [Purpura] : no purpura  [Petechiae] : no petechiae [Skin Ulcer] : no ulcer [Skin Induration] : no induration [de-identified] : Well-nourished, NAD [de-identified] : NC/AT [de-identified] : Supple  [de-identified] : Large pannus [de-identified] : FROM in LE b/l, strength 5/5 x 4. [de-identified] : grossly intact

## 2023-06-19 ENCOUNTER — APPOINTMENT (OUTPATIENT)
Dept: VASCULAR SURGERY | Facility: CLINIC | Age: 68
End: 2023-06-19
Payer: MEDICARE

## 2023-06-19 VITALS — HEART RATE: 72 BPM | SYSTOLIC BLOOD PRESSURE: 173 MMHG | DIASTOLIC BLOOD PRESSURE: 98 MMHG

## 2023-06-19 PROCEDURE — 99212 OFFICE O/P EST SF 10 MIN: CPT

## 2023-06-19 RX ORDER — SILVER SULFADIAZINE 10 MG/G
1 CREAM TOPICAL DAILY
Qty: 30 | Refills: 1 | Status: ACTIVE | COMMUNITY
Start: 2023-06-19 | End: 1900-01-01

## 2023-06-19 RX ORDER — AMOXICILLIN AND CLAVULANATE POTASSIUM 875; 125 MG/1; MG/1
875-125 TABLET, COATED ORAL
Qty: 14 | Refills: 0 | Status: ACTIVE | COMMUNITY
Start: 2023-06-19 | End: 1900-01-01

## 2023-06-19 NOTE — ASSESSMENT
[Arterial/Venous Disease] : arterial/venous disease [Foot care/Footwear] : foot care/footwear [Ulcer Care] : ulcer care [FreeTextEntry1] : 67yoF with hx of RLE venous stasis ulcers treated with unna boot, finally healed and she returned last week since she developed new ulcers over her right medial malleolus.\par RLE with increased lower leg edema, multiple ulcerations over medial malleolus draining clear fluid, mild surrounding erythema\par Silvadene ointment was applied to the ulcers, leg was wrapped with kerlix/ace.\par We discussed the findings and  recommend for her to do a wound care with Silvadene for a week and start antibiotics, Rx for Augmentin was sent to her pharmacy.\par Patient will come back in 1 week for reevaluation. If worsening pain, fever she was recommended to go to ED.\par

## 2023-06-19 NOTE — PHYSICAL EXAM
[Normal Thyroid] : the thyroid was normal [Normal Breath Sounds] : Normal breath sounds [Respiratory Effort] : normal respiratory effort [Normal Heart Sounds] : normal heart sounds [Normal Rate and Rhythm] : normal rate and rhythm [2+] : right 2+ [Ankle Swelling (On Exam)] : present [Ankle Swelling On The Left] : moderate [] : bilaterally [Ankle Swelling On The Right] : mild [Alert] : alert [Oriented to Person] : oriented to person [Oriented to Place] : oriented to place [Oriented to Time] : oriented to time [Calm] : calm [JVD] : no jugular venous distention  [Purpura] : no purpura  [Petechiae] : no petechiae [Skin Ulcer] : no ulcer [Skin Induration] : no induration [de-identified] : Well-nourished, NAD [de-identified] : NC/AT [de-identified] : Supple  [de-identified] : Large pannus [de-identified] : FROM in LE b/l, strength 5/5 x 4. [de-identified] : RLE with increased lower leg edema, multiple ulcerations over medial malleolus draining clear fluid, mild surrounding erythema [de-identified] : grossly intact

## 2023-06-19 NOTE — HISTORY OF PRESENT ILLNESS
[FreeTextEntry1] : 67yoF with hx of RLE venous stasis ulcers treated with unna boot, finally healed and she returned last week since she developed new ulcers over her right medial malleolus. Patient  c/o pain over area, increased edema and drainage. She tried to wrap her leg with unna boot, however due to large amount of drainage, she is unable to keep it on.  She denies fever, chills.

## 2023-06-19 NOTE — PROCEDURE
[FreeTextEntry1] : RLE: Silvadene ointment was applied to the ulcers, leg was wrapped with kerlix/ace.

## 2023-06-26 ENCOUNTER — APPOINTMENT (OUTPATIENT)
Dept: VASCULAR SURGERY | Facility: CLINIC | Age: 68
End: 2023-06-26
Payer: MEDICARE

## 2023-06-26 PROCEDURE — 99212 OFFICE O/P EST SF 10 MIN: CPT | Mod: 25

## 2023-06-26 PROCEDURE — 29580 STRAPPING UNNA BOOT: CPT

## 2023-06-26 NOTE — ASSESSMENT
[FreeTextEntry1] : 67yoF with hx of RLE venous stasis ulcers treated with unna boot, now with recurrent ulcers over her right medial malleolus. Completed course of Augmentin, completes local wound care with daily Silvadene ointment to the ulceration, admits to some improvement. No fever/chills.\par RLE with moderate lower leg edema, multiple ulcerations over medial malleolus, no erythema. We recommend to start unna boot wrappings since there is less drainage.\par RLE was strapped with Calamine unna boot, wrapped with kerlix and ace bandage.\par Pt will f/u in 1 week. [Arterial/Venous Disease] : arterial/venous disease [Foot care/Footwear] : foot care/footwear [Ulcer Care] : ulcer care

## 2023-06-26 NOTE — HISTORY OF PRESENT ILLNESS
[FreeTextEntry1] : 67yoF with hx of RLE venous stasis ulcers treated with unna boot, finally healed and she returned last week since she developed new ulcers over her right medial malleolus. Patient c/o pain over area, increased edema and drainage. She tried to wrap her leg with unna boot, however due to large amount of drainage, she is unable to keep it on. Last week we recommended to apply Silvadene ointment to the ulcers daily and she was started on Augmentin. She states that there is less drainage, however she continues to experience pain.  She denies fever, chills.

## 2023-06-26 NOTE — PHYSICAL EXAM
[JVD] : no jugular venous distention  [Normal Thyroid] : the thyroid was normal [Normal Breath Sounds] : Normal breath sounds [Respiratory Effort] : normal respiratory effort [Normal Heart Sounds] : normal heart sounds [Normal Rate and Rhythm] : normal rate and rhythm [2+] : right 2+ [Ankle Swelling (On Exam)] : present [Ankle Swelling On The Left] : moderate [] : bilaterally [Ankle Swelling On The Right] : mild [Purpura] : no purpura  [Petechiae] : no petechiae [Skin Ulcer] : no ulcer [Skin Induration] : no induration [Alert] : alert [Oriented to Person] : oriented to person [Oriented to Place] : oriented to place [Oriented to Time] : oriented to time [Calm] : calm [de-identified] : Well-nourished, NAD [de-identified] : Supple  [de-identified] : NC/AT [de-identified] : Large pannus [de-identified] : FROM in LE b/l, strength 5/5 x 4. [de-identified] : RLE with moderate lower leg edema, multiple ulcerations over medial malleolus, no erythema [de-identified] : grossly intact

## 2023-07-03 ENCOUNTER — APPOINTMENT (OUTPATIENT)
Dept: VASCULAR SURGERY | Facility: CLINIC | Age: 68
End: 2023-07-03
Payer: MEDICARE

## 2023-07-03 DIAGNOSIS — L97.911 NON-PRESSURE CHRONIC ULCER OF UNSPECIFIED PART OF RIGHT LOWER LEG LIMITED TO BREAKDOWN OF SKIN: ICD-10-CM

## 2023-07-03 PROCEDURE — 99212 OFFICE O/P EST SF 10 MIN: CPT

## 2023-07-03 NOTE — PHYSICAL EXAM
[JVD] : no jugular venous distention  [Normal Thyroid] : the thyroid was normal [Normal Breath Sounds] : Normal breath sounds [Respiratory Effort] : normal respiratory effort [Normal Heart Sounds] : normal heart sounds [Normal Rate and Rhythm] : normal rate and rhythm [2+] : right 2+ [Ankle Swelling (On Exam)] : present [Ankle Swelling On The Left] : moderate [] : bilaterally [Ankle Swelling On The Right] : mild [Purpura] : no purpura  [Petechiae] : no petechiae [Skin Ulcer] : no ulcer [Skin Induration] : no induration [Alert] : alert [Oriented to Person] : oriented to person [Oriented to Place] : oriented to place [Oriented to Time] : oriented to time [Calm] : calm [de-identified] : Well-nourished, NAD [de-identified] : NC/AT [de-identified] : Supple  [de-identified] : Large pannus [de-identified] : FROM in LE b/l, strength 5/5 x 4. [de-identified] : RLE with moderate lower leg edema, multiple ulcerations over medial malleolus draining clear fluid, no erythema, no foul smell [de-identified] : grossly intact

## 2023-07-03 NOTE — ASSESSMENT
[FreeTextEntry1] : 67yoF with hx of RLE venous stasis ulcers treated with unna boot, now with recurrent ulcers over her right medial malleolus. Completed course of Augmentin, completes local wound care with daily Silvadene ointment to the ulceration. She continues to have pain over the ulcerations and clear drainage, denies fever, chills. \par RLE with moderate lower leg edema, multiple ulcerations over medial malleolus, clear fluid drainage, no erythema, no signs of infection. We recommend to continue with daily local wound care with Silvadene. She has an appointment with her PCP in 2 days, recommended to ask if diuretic can be prescribed to control her legs edema.\par Recommended bed rest and leg elevation for most of a day\par Silvadene ointment was applied to the ulcers, hydrocortisone cream applied to surrounding skin, leg was strapped with kerlix and ace bandage.\par Pt will f/u in 2 weeks since next Monday she is scheduled to see a wound specialist. [Arterial/Venous Disease] : arterial/venous disease [Foot care/Footwear] : foot care/footwear [Ulcer Care] : ulcer care

## 2023-07-03 NOTE — PROCEDURE
[FreeTextEntry1] : RLE: medial malleolus ulcerations were cleansed with Hydrogen Peroxide, Silvadene ointment was applied to the ulcers, hydrocortisone cream applied to surrounding skin, leg was strapped with kerlix and ace bandage.

## 2023-07-03 NOTE — HISTORY OF PRESENT ILLNESS
[FreeTextEntry1] : 67yoF with hx of RLE venous stasis ulcers treated with unna boot in the past, now with recurrent new ulcers over her right medial malleolus. Patient c/o pain over area, increased edema and drainage. Due to a lot of drainage we recommended to do daily application of Silvadene, she also completed a course of Augmentin that helped and last week we applied Calamine unna boot. She was able to keep the unna boot for 4 days, however her drainage has increased and she removed unna boot. She continues to have pain over the ulcerations and clear drainage, denies fever, chills.

## 2023-07-07 NOTE — HISTORY OF PRESENT ILLNESS
[FreeTextEntry1] : 67yoF with hx of RLE venous stasis ulcers treated with unna boot, finally healed and she was last seen in February returns today for an evaluation. She developed new ulcers over her right medial malleolus and has a clear drainage. Patient also c/o pain over area, denies fever, chills. She tried to wrap her leg with unna boot, however due to large amount of drainage, she is unable to keep it on since it becomes wet after few hours. She states that she has been compliant with compression stockings, denies recent weight gain or change in her medications. [Patient] : patient [Mother] : mother [Follow-Up: _____] : [unfilled] is  being seen for a [unfilled] follow-up visit

## 2023-11-09 NOTE — ASSESSMENT
[Foot care/Footwear] : foot care/footwear [Ulcer Care] : ulcer care [FreeTextEntry1] : 68 y/o F presents today for a follow up on  RLE venous stasis that is almost healed. She has been responding well  unna boot wrap. \par R medial calf with almost healed ulcer, no signs of infection. \par RLE was wrapped with Zinc unna boot, Kerlix and ACE bandage.\par Patient will come back in 1 week for reevaluation.\par   Negative Screen